# Patient Record
Sex: FEMALE | Race: WHITE | NOT HISPANIC OR LATINO
[De-identification: names, ages, dates, MRNs, and addresses within clinical notes are randomized per-mention and may not be internally consistent; named-entity substitution may affect disease eponyms.]

---

## 2017-03-20 ENCOUNTER — RESULT REVIEW (OUTPATIENT)
Age: 52
End: 2017-03-20

## 2017-11-20 ENCOUNTER — APPOINTMENT (OUTPATIENT)
Dept: COLORECTAL SURGERY | Facility: CLINIC | Age: 52
End: 2017-11-20
Payer: COMMERCIAL

## 2017-11-20 VITALS
SYSTOLIC BLOOD PRESSURE: 135 MMHG | BODY MASS INDEX: 28.35 KG/M2 | DIASTOLIC BLOOD PRESSURE: 84 MMHG | WEIGHT: 160 LBS | HEART RATE: 77 BPM | RESPIRATION RATE: 14 BRPM | HEIGHT: 63 IN | OXYGEN SATURATION: 100 %

## 2017-11-20 DIAGNOSIS — Z86.19 PERSONAL HISTORY OF OTHER INFECTIOUS AND PARASITIC DISEASES: ICD-10-CM

## 2017-11-20 DIAGNOSIS — Z86.39 PERSONAL HISTORY OF OTHER ENDOCRINE, NUTRITIONAL AND METABOLIC DISEASE: ICD-10-CM

## 2017-11-20 DIAGNOSIS — K64.9 UNSPECIFIED HEMORRHOIDS: ICD-10-CM

## 2017-11-20 PROCEDURE — 46600 DIAGNOSTIC ANOSCOPY SPX: CPT

## 2017-11-20 PROCEDURE — 99204 OFFICE O/P NEW MOD 45 MIN: CPT | Mod: 25

## 2017-11-20 RX ORDER — DICLOFENAC SODIUM 20 MG/G
2 SOLUTION TOPICAL
Qty: 112 | Refills: 0 | Status: DISCONTINUED | COMMUNITY
Start: 2016-11-16

## 2017-11-20 RX ORDER — PRAVASTATIN SODIUM 20 MG/1
20 TABLET ORAL
Refills: 0 | Status: ACTIVE | COMMUNITY

## 2017-11-20 RX ORDER — DICLOFENAC POTASSIUM 50 MG/1
TABLET, COATED ORAL
Refills: 0 | Status: ACTIVE | COMMUNITY

## 2017-11-20 RX ORDER — LEVOTHYROXINE SODIUM 75 UG/1
75 TABLET ORAL
Refills: 0 | Status: ACTIVE | COMMUNITY

## 2017-11-27 RX ORDER — LIDOCAINE 5 G/100G
5 OINTMENT TOPICAL
Qty: 30 | Refills: 4 | Status: ACTIVE | COMMUNITY
Start: 2017-11-27 | End: 1900-01-01

## 2017-12-18 ENCOUNTER — APPOINTMENT (OUTPATIENT)
Dept: COLORECTAL SURGERY | Facility: CLINIC | Age: 52
End: 2017-12-18
Payer: COMMERCIAL

## 2017-12-18 PROCEDURE — 99214 OFFICE O/P EST MOD 30 MIN: CPT | Mod: 25

## 2017-12-18 PROCEDURE — 46600 DIAGNOSTIC ANOSCOPY SPX: CPT

## 2017-12-18 RX ORDER — NITROGLYCERIN 20 MG/G
2 OINTMENT TOPICAL
Qty: 1 | Refills: 3 | Status: DISCONTINUED | COMMUNITY
Start: 2017-11-20 | End: 2017-12-18

## 2018-01-03 ENCOUNTER — APPOINTMENT (OUTPATIENT)
Dept: COLORECTAL SURGERY | Facility: CLINIC | Age: 53
End: 2018-01-03
Payer: COMMERCIAL

## 2018-01-03 DIAGNOSIS — K60.2 ANAL FISSURE, UNSPECIFIED: ICD-10-CM

## 2018-01-03 PROCEDURE — 46600 DIAGNOSTIC ANOSCOPY SPX: CPT

## 2018-01-03 PROCEDURE — 99213 OFFICE O/P EST LOW 20 MIN: CPT | Mod: 25

## 2018-01-26 ENCOUNTER — APPOINTMENT (OUTPATIENT)
Dept: COLORECTAL SURGERY | Facility: HOSPITAL | Age: 53
End: 2018-01-26

## 2018-02-02 ENCOUNTER — APPOINTMENT (OUTPATIENT)
Dept: COLORECTAL SURGERY | Facility: HOSPITAL | Age: 53
End: 2018-02-02

## 2018-02-14 ENCOUNTER — APPOINTMENT (OUTPATIENT)
Dept: COLORECTAL SURGERY | Facility: CLINIC | Age: 53
End: 2018-02-14
Payer: SELF-PAY

## 2018-02-14 PROCEDURE — 99212 OFFICE O/P EST SF 10 MIN: CPT

## 2018-03-26 ENCOUNTER — RESULT REVIEW (OUTPATIENT)
Age: 53
End: 2018-03-26

## 2018-05-17 ENCOUNTER — EMERGENCY (EMERGENCY)
Facility: HOSPITAL | Age: 53
LOS: 1 days | End: 2018-05-17
Attending: PERSONAL EMERGENCY RESPONSE ATTENDANT | Admitting: PERSONAL EMERGENCY RESPONSE ATTENDANT
Payer: COMMERCIAL

## 2018-05-17 VITALS — RESPIRATION RATE: 18 BRPM | SYSTOLIC BLOOD PRESSURE: 142 MMHG | HEART RATE: 95 BPM | DIASTOLIC BLOOD PRESSURE: 100 MMHG

## 2018-05-17 PROCEDURE — 73564 X-RAY EXAM KNEE 4 OR MORE: CPT | Mod: 26,LT

## 2018-05-17 PROCEDURE — 99283 EMERGENCY DEPT VISIT LOW MDM: CPT

## 2018-05-17 PROCEDURE — 73564 X-RAY EXAM KNEE 4 OR MORE: CPT

## 2018-05-17 RX ORDER — IBUPROFEN 200 MG
600 TABLET ORAL ONCE
Qty: 0 | Refills: 0 | Status: COMPLETED | OUTPATIENT
Start: 2018-05-17 | End: 2018-05-17

## 2018-05-17 RX ADMIN — Medication 600 MILLIGRAM(S): at 20:36

## 2018-05-17 RX ADMIN — Medication 600 MILLIGRAM(S): at 21:30

## 2018-05-17 NOTE — ED PROVIDER NOTE - PROGRESS NOTE DETAILS
xrays negative for fracture, pt feels well enough to go home, ace wrapped for compression and knee immobilizer placed, pt will follow up with ortho if pain persists  Annie Gonzalez, PGY-1 EM Attending MD [place].  KNee remains stable on varus, valgus stress and anterior/posterior drawer. Able to ambulate with knee immobilizer.  Counseled re: poss meniscus/ligamentous strain vs. tear and need to follow-up with orthopedics as outpt for continued management/reassessment.  Pt verbalizes comfort with discharge plan and acknowledges plan to tx pain with alternating tylenol/motrin.  Stable for dsicharge home with family present to aid in transport.

## 2018-05-17 NOTE — ED PROVIDER NOTE - PLAN OF CARE
Take all medications as directed.  For pain take Ibuprofen (Motrin, Advil) 400mg-600mg every 6-8 hours or Acetaminophen (Tylenol) 250mg-650mg every 6-8 hours.  Follow up with your primary physician in 3-4 days. If needed call [phone] to find a primary care physician or call  [phone] to schedule an appointment with the general medicine clinic.   You were given copies of all labs and imaging results from this ER visit, please take them to your appointment.  Return to the ER for worsening symptoms or any other concerns.

## 2018-05-17 NOTE — ED PROVIDER NOTE - MEDICAL DECISION MAKING DETAILS
Left knee pain with no obvious deformity, effusion or ecchymosis. Rule-out patellar fracture, ddx includes bursitis or bone contusion.   Annie Gonzalez, PGY-1 EM

## 2018-05-17 NOTE — ED PROVIDER NOTE - PHYSICAL EXAMINATION
msk: mild bruising over the patella, tenderness to palpation, able to range the joint, no ligamentous laxity. No obvious deformity, effusion or swelling. distal pulses intact

## 2018-05-17 NOTE — ED PROVIDER NOTE - OBJECTIVE STATEMENT
53 yo F p/w left knee pain s/p fall at grocery store this evening. Pt reports she slipped on string beans and fell directly onto the left knee. Was on the ground for 5 minutes and then helped to her feet by 2 people but did no ambulate. Pt reports the pain to be on the front of her knee. No other injuries to the leg or knee in the past. No other msk injuries sustained during the fall.

## 2018-05-17 NOTE — ED PROVIDER NOTE - CARE PLAN
Principal Discharge DX:	Knee pain, left Principal Discharge DX:	Knee pain, left  Assessment and plan of treatment:	Take all medications as directed.  For pain take Ibuprofen (Motrin, Advil) 400mg-600mg every 6-8 hours or Acetaminophen (Tylenol) 250mg-650mg every 6-8 hours.  Follow up with your primary physician in 3-4 days. If needed call [phone] to find a primary care physician or call  [phone] to schedule an appointment with the general medicine clinic.   You were given copies of all labs and imaging results from this ER visit, please take them to your appointment.  Return to the ER for worsening symptoms or any other concerns.

## 2018-05-17 NOTE — ED PROVIDER NOTE - ATTENDING CONTRIBUTION TO CARE
Attending MD [place].  Agree with above.  Pt is a 52 yr old female who presents after she fell and hit her knee in the grocery store today directly onto L knee unable to ambulate after event.  Needed help to stand with assistance.  Exam unremarkable for erythema/warmth/edema. No laxity on exam.  Able to range joint with some discomfort.  No sig visible bruising.  Mild patellar TTP.  Pain in knee going down leg.  DId not hit head.  No blood thinners on board.

## 2018-05-17 NOTE — ED ADULT NURSE NOTE - OBJECTIVE STATEMENT
52 F pt c/o tripping and felt a pop to L knee, c/o pain radiating down shin. Pt VSS, NAD at this time, family at bedside. Pt A&Ox3, calm/cooperative.

## 2024-02-06 ENCOUNTER — APPOINTMENT (OUTPATIENT)
Dept: OBGYN | Facility: CLINIC | Age: 59
End: 2024-02-06
Payer: COMMERCIAL

## 2024-02-06 PROCEDURE — 99396 PREV VISIT EST AGE 40-64: CPT

## 2025-03-03 ENCOUNTER — APPOINTMENT (OUTPATIENT)
Dept: OBGYN | Facility: CLINIC | Age: 60
End: 2025-03-03
Payer: COMMERCIAL

## 2025-03-03 PROCEDURE — 99396 PREV VISIT EST AGE 40-64: CPT

## 2025-03-03 PROCEDURE — 99459 PELVIC EXAMINATION: CPT

## 2025-07-21 NOTE — ED ADULT NURSE NOTE - CAS DISCH ACCOMP BY
Chief complaint/Reason for consult: hypothyroidism    HPI:   - 57 year old female here for hypothyroidism  - Last seen in 9/2024  - Is currently on levothyroxine 125 mcg daily  - Denies missing any does of her levothyroxine  - She takes her levothyroxine at the same time every day, on an empty stomach, and not with hot beverages  - She has fatigue    The following portions of the patient's history were reviewed and updated as appropriate: allergies, current medications, past family history, past medical history, past social history, past surgical history, and problem list.      Objective     Vitals:    07/21/25 1553   BP: 128/84   Pulse: 92   SpO2: 96%        Physical Exam  Vitals reviewed.   Constitutional:       Appearance: Normal appearance.   HENT:      Head: Normocephalic and atraumatic.   Eyes:      General: No scleral icterus.  Pulmonary:      Effort: Pulmonary effort is normal. No respiratory distress.   Neurological:      Mental Status: She is alert.      Gait: Gait normal.   Psychiatric:         Mood and Affect: Mood normal.         Behavior: Behavior normal.         Thought Content: Thought content normal.         Judgment: Judgment normal.       Assessment & Plan   Hypothyroidism  - Will check TSH and free T4 today  - Currently on levothyroxine 125 mcg daily    - Return to clinic in 6 months     Family